# Patient Record
Sex: MALE | Race: WHITE | NOT HISPANIC OR LATINO | URBAN - METROPOLITAN AREA
[De-identification: names, ages, dates, MRNs, and addresses within clinical notes are randomized per-mention and may not be internally consistent; named-entity substitution may affect disease eponyms.]

---

## 2017-01-17 PROBLEM — G47.30 SLEEP APNEA, UNSPECIFIED TYPE: Status: ACTIVE | Noted: 2017-01-17

## 2017-10-09 PROBLEM — E55.9 VITAMIN D DEFICIENCY: Status: ACTIVE | Noted: 2017-10-09

## 2018-10-16 PROBLEM — G45.9 TIA (TRANSIENT ISCHEMIC ATTACK): Status: ACTIVE | Noted: 2018-10-16

## 2021-07-29 PROBLEM — D56.3 THALASSEMIA MINOR: Status: ACTIVE | Noted: 2021-07-29

## 2023-11-03 DIAGNOSIS — E78.2 MIXED HYPERLIPIDEMIA: Primary | ICD-10-CM

## 2023-11-10 ENCOUNTER — APPOINTMENT (OUTPATIENT)
Dept: URGENT CARE | Age: 68
End: 2023-11-10

## 2023-12-14 ENCOUNTER — APPOINTMENT (OUTPATIENT)
Dept: CT IMAGING | Age: 68
End: 2023-12-14
Attending: STUDENT IN AN ORGANIZED HEALTH CARE EDUCATION/TRAINING PROGRAM

## 2023-12-14 DIAGNOSIS — E78.2 MIXED HYPERLIPIDEMIA: ICD-10-CM

## 2023-12-20 ENCOUNTER — TELEPHONE (OUTPATIENT)
Dept: CARDIOLOGY | Age: 68
End: 2023-12-20

## 2024-05-13 ENCOUNTER — HOSPITAL ENCOUNTER (OUTPATIENT)
Age: 69
Discharge: HOME | End: 2024-05-13

## 2024-05-13 ENCOUNTER — APPOINTMENT (OUTPATIENT)
Dept: GENERAL RADIOLOGY | Age: 69
End: 2024-05-13
Attending: NURSE PRACTITIONER

## 2024-05-13 VITALS
RESPIRATION RATE: 20 BRPM | SYSTOLIC BLOOD PRESSURE: 150 MMHG | HEART RATE: 80 BPM | TEMPERATURE: 98 F | DIASTOLIC BLOOD PRESSURE: 70 MMHG | OXYGEN SATURATION: 99 %

## 2024-05-13 DIAGNOSIS — R06.02 SOB (SHORTNESS OF BREATH): ICD-10-CM

## 2024-05-13 DIAGNOSIS — J45.21 MILD INTERMITTENT ASTHMA WITH EXACERBATION (HCC): ICD-10-CM

## 2024-05-13 DIAGNOSIS — J20.8 ACUTE VIRAL BRONCHITIS: Primary | ICD-10-CM

## 2024-05-13 DIAGNOSIS — R05.1 ACUTE COUGH: ICD-10-CM

## 2024-05-13 LAB
#MXD IC: 0.5 X10ˆ3/UL (ref 0.1–1)
BUN BLD-MCNC: 14 MG/DL (ref 7–18)
CHLORIDE BLD-SCNC: 106 MMOL/L (ref 98–112)
CO2 BLD-SCNC: 22 MMOL/L (ref 21–32)
CREAT BLD-MCNC: 0.8 MG/DL
DDIMER WHOLE BLOOD: <200 NG/ML DDU (ref ?–400)
EGFRCR SERPLBLD CKD-EPI 2021: 96 ML/MIN/1.73M2 (ref 60–?)
GLUCOSE BLD-MCNC: 80 MG/DL (ref 70–99)
HCT VFR BLD AUTO: 31.7 %
HCT VFR BLD CALC: 31 %
HGB BLD-MCNC: 8.6 G/DL
ISTAT IONIZED CALCIUM FOR CHEM 8: 1.29 MMOL/L (ref 1.12–1.32)
LYMPHOCYTES # BLD AUTO: 1.7 X10ˆ3/UL (ref 1–4)
LYMPHOCYTES NFR BLD AUTO: 28.9 %
MCH RBC QN AUTO: <20 PG (ref 26–34)
MCHC RBC AUTO-ENTMCNC: 27.1 G/DL (ref 31–37)
MCV RBC AUTO: 65.8 FL (ref 80–100)
MIXED CELL %: 8.7 %
NEUTROPHILS # BLD AUTO: 3.6 X10ˆ3/UL (ref 1.5–7.7)
NEUTROPHILS NFR BLD AUTO: 62.4 %
PLATELET # BLD AUTO: 333 X10ˆ3/UL (ref 150–450)
POCT INFLUENZA A: NEGATIVE
POCT INFLUENZA B: NEGATIVE
POTASSIUM BLD-SCNC: 4 MMOL/L (ref 3.6–5.1)
RBC # BLD AUTO: 4.82 X10ˆ6/UL
S PYO AG THROAT QL: NEGATIVE
SARS-COV-2 RNA RESP QL NAA+PROBE: NOT DETECTED
SODIUM BLD-SCNC: 143 MMOL/L (ref 136–145)
TROPONIN I BLD-MCNC: <0.02 NG/ML
WBC # BLD AUTO: 5.8 X10ˆ3/UL (ref 4–11)

## 2024-05-13 PROCEDURE — 80047 BASIC METABLC PNL IONIZED CA: CPT | Performed by: NURSE PRACTITIONER

## 2024-05-13 PROCEDURE — 87502 INFLUENZA DNA AMP PROBE: CPT | Performed by: NURSE PRACTITIONER

## 2024-05-13 PROCEDURE — 87880 STREP A ASSAY W/OPTIC: CPT | Performed by: NURSE PRACTITIONER

## 2024-05-13 PROCEDURE — 71046 X-RAY EXAM CHEST 2 VIEWS: CPT | Performed by: NURSE PRACTITIONER

## 2024-05-13 PROCEDURE — 94640 AIRWAY INHALATION TREATMENT: CPT | Performed by: NURSE PRACTITIONER

## 2024-05-13 PROCEDURE — 84484 ASSAY OF TROPONIN QUANT: CPT | Performed by: NURSE PRACTITIONER

## 2024-05-13 PROCEDURE — 85025 COMPLETE CBC W/AUTO DIFF WBC: CPT | Performed by: NURSE PRACTITIONER

## 2024-05-13 PROCEDURE — 99204 OFFICE O/P NEW MOD 45 MIN: CPT | Performed by: NURSE PRACTITIONER

## 2024-05-13 PROCEDURE — U0002 COVID-19 LAB TEST NON-CDC: HCPCS | Performed by: NURSE PRACTITIONER

## 2024-05-13 PROCEDURE — 93000 ELECTROCARDIOGRAM COMPLETE: CPT | Performed by: NURSE PRACTITIONER

## 2024-05-13 RX ORDER — BENZONATATE 200 MG/1
200 CAPSULE ORAL 3 TIMES DAILY PRN
Qty: 20 CAPSULE | Refills: 0 | Status: SHIPPED | OUTPATIENT
Start: 2024-05-13

## 2024-05-13 RX ORDER — IPRATROPIUM BROMIDE AND ALBUTEROL SULFATE 2.5; .5 MG/3ML; MG/3ML
3 SOLUTION RESPIRATORY (INHALATION) ONCE
Status: COMPLETED | OUTPATIENT
Start: 2024-05-13 | End: 2024-05-13

## 2024-05-13 RX ORDER — PREDNISONE 20 MG/1
40 TABLET ORAL DAILY
Qty: 10 TABLET | Refills: 0 | Status: SHIPPED | OUTPATIENT
Start: 2024-05-13 | End: 2024-05-18

## 2024-05-13 RX ORDER — ALBUTEROL SULFATE 90 UG/1
2 AEROSOL, METERED RESPIRATORY (INHALATION) EVERY 4 HOURS PRN
Qty: 1 EACH | Refills: 0 | Status: SHIPPED | OUTPATIENT
Start: 2024-05-13 | End: 2024-06-12

## 2024-05-13 NOTE — ED PROVIDER NOTES
Chief Complaint   Patient presents with    Cough/URI       HPI:     Leodan Rodriguez is a 69 year old male who presents with a 3-day history of shortness of breath, dyspnea on exertion, headache, mild lightheadedness, and productive cough.  Patient states cough is productive of thick sputum and at times he is seeing a streak of blood in the sputum.  Patient states he returned from a trip to [place] 3 days ago and he has had the symptoms since being on the plane and returning home.  Patient does have a history of asthma and takes Breo daily however he states his albuterol has  and has not tried the albuterol to manage his current symptoms.  Patient denies any palpitations, he denies any specific chest pain.  He denies any near-syncope or syncopal episodes.  He has been managing his symptoms with Tylenol, Mucinex, and Zyrtec.  He states other members of his traveling group were ill last week with vomiting and diarrhea.  He denies any current nausea vomiting or diarrhea he denies any abdominal pain at this time.  Patient does have a significant history he has a known thalassemia anemia, diabetes, asthma, and had an acute myocardial infarction with stents in January of this year.  He is anticoagulated on both Xarelto and Plavix.      PFSH    PFSH asessment screens reviewed and agree.  Nurses notes reviewed I agree with documentation.    Family History   Problem Relation Age of Onset    Other (Other) Father         stroke at age 50    Heart Disorder Mother      Family history is reviewed and is as noted in HPI.  Social History     Socioeconomic History    Marital status:      Spouse name: Not on file    Number of children: Not on file    Years of education: Not on file    Highest education level: Not on file   Occupational History    Not on file   Tobacco Use    Smoking status: Never    Smokeless tobacco: Never   Vaping Use    Vaping status: Never Used   Substance and Sexual Activity    Alcohol use: Yes      Comment: occasional    Drug use: No    Sexual activity: Yes     Partners: Female   Other Topics Concern    Not on file   Social History Narrative    Not on file     Social Determinants of Health     Financial Resource Strain: Not on file   Food Insecurity: Not on file   Transportation Needs: Not on file   Physical Activity: Sufficiently Active (2/7/2024)    Received from [employer]    Physical Activity     On average, how many days per week do you engage in moderate to strenuous exercise (like a brisk walk)?: 7 days     On average, how many minutes do you engage in exercise at this level?: 30 min   Stress: Not on file   Social Connections: Not on file   Housing Stability: Not on file         ROS:   Positive for stated complaint: Shortness of breath  All other systems reviewed and negative except as noted above.  Constitutional and Vital Signs Reviewed.      Physical Exam:     Findings:    /70   Pulse 80   Temp 97.7 °F (36.5 °C) (Temporal)   Resp 20   SpO2 99%   GENERAL: well developed, well nourished, well hydrated, no distress  SKIN: good skin turgor, no obvious rashes  NECK: supple, no adenopathy  CARDIO: RRR without murmur, Cap refill brisk  EXTREMITIES: SPRING without difficulty, no weakness  HEAD: normocephalic, atraumatic, no facial asymmetry  EYES: bilateral sclera non icteric, conjunctiva mildly pale, no conjunctival injection or discharge, no periorbital swelling  EARS: Bilateral EACs clear, TMs without erythema or bulging, COL wnl,   NOSE: nasal mucosa pink, no discharge or bleeding  THROAT: airway patent, no intraoral lesions.  Has few red spots on uvula and soft palate.  No other erythema or tonsillar hypertrophy, no exudates, uvula midline,   LUNGS: Mildly diminished AE throughout chest, no rales or rhonchi, scattered wheezing throughout lung fields.  No signs of increased WOB, speaking in full sentences  NEURO: No observed focal deficits, speech clear  PSYCH: Alert and oriented x3.  Answering  questions appropriately.  Mood appropriate.    MDM/Assessment/Plan:   Orders for this encounter:    Orders Placed This Encounter    XR CHEST PA + LAT CHEST (CPT=71046)     Cough, Sob Pt developed a cough when flying back from [place] on Friday. States he is   coughing up green/yellow mucus that has streaks of blood. States the cough   is causing him to be short of breath. Pt with hx of asthma. Denies fevers.   Denies sick contacts.        Order Specific Question:   What is the Relevant Clinical Indication / Reason for Exam?     Answer:   Cough, Sob     Order Specific Question:   Release to patient     Answer:   Immediate    D-Dimer,Triage     Order Specific Question:   Release to patient     Answer:   Immediate    POCT CBC     Order Specific Question:   Release to patient     Answer:   Immediate    EKG 12 Lead     Order Specific Question:   Release to patient     Answer:   Immediate    POCT Rapid Strep    POCT ISTAT chem8 cartridge    ISTAT Troponin    POCT Flu Test     Order Specific Question:   Release to patient     Answer:   Immediate    Rapid SARS-CoV-2 by PCR     Order Specific Question:   Release to patient     Answer:   Immediate    iStat (Troponin)    iStat (Chem 8)    POCT Rapid Strep    iStat (Troponin)    ipratropium-albuterol (Duoneb) 0.5-2.5 (3) MG/3ML inhalation solution 3 mL     Order Specific Question:   Which area/hospital     Answer:   Intermittent nebulizer    albuterol 108 (90 Base) MCG/ACT Inhalation Aero Soln     Sig: Inhale 2 puffs into the lungs every 4 (four) hours as needed for Wheezing.     Dispense:  1 each     Refill:  0    predniSONE 20 MG Oral Tab     Sig: Take 2 tablets (40 mg total) by mouth daily for 5 days.     Dispense:  10 tablet     Refill:  0    benzonatate 200 MG Oral Cap     Sig: Take 1 capsule (200 mg total) by mouth 3 (three) times daily as needed for cough.     Dispense:  20 capsule     Refill:  0       Labs performed this visit:  Recent Results (from the past 10 hour(s))    POCT Flu Test    Collection Time: 05/13/24  5:43 PM    Specimen: Nares; Other   Result Value Ref Range    POCT INFLUENZA A Negative Negative    POCT INFLUENZA B Negative Negative   Rapid SARS-CoV-2 by PCR    Collection Time: 05/13/24  5:43 PM    Specimen: Nares; Other   Result Value Ref Range    Rapid SARS-CoV-2 by PCR Not Detected Not Detected   D-Dimer,Triage    Collection Time: 05/13/24  5:43 PM   Result Value Ref Range    D-Dimer DDU <200 <400 ng/mL DDU   POCT CBC    Collection Time: 05/13/24  5:43 PM   Result Value Ref Range    WBC IC 5.8 4.0 - 11.0 x10ˆ3/uL    RBC IC 4.82 3.80 - 5.80 X10ˆ6/uL    HGB IC 8.6 (L) 13.0 - 17.5 g/dL    HCT IC 31.7 (L) 39.0 - 53.0 %    MCV IC 65.8 (L) 80.0 - 100.0 fL    MCH IC <20.0 (L) 26.0 - 34.0 pg    MCHC IC 27.1 (L) 31.0 - 37.0 g/dL    PLT .0 150.0 - 450.0 X10ˆ3/uL    # Neutrophil 3.6 1.5 - 7.7 X10ˆ3/uL    # Lymphocyte 1.7 1.0 - 4.0 X10ˆ3/uL    # Mixed Cells 0.5 0.1 - 1.0 X10ˆ3/uL    Neutrophil % 62.4 %    Lymphocyte % 28.9 %    Mixed Cell % 8.7 %   POCT Rapid Strep    Collection Time: 05/13/24  5:52 PM   Result Value Ref Range    POCT Rapid Strep Negative Negative   EKG 12 Lead    Collection Time: 05/13/24  5:59 PM   Result Value Ref Range    Ventricular rate 76 BPM    Atrial rate 76 BPM    P-R Interval 136 ms    QRS Duration 94 ms    Q-T Interval 370 ms    QTC Calculation (Bezet) 416 ms    P Axis 53 degrees    R Axis -26 degrees    T Axis 32 degrees   POCT ISTAT chem8 cartridge    Collection Time: 05/13/24  6:09 PM   Result Value Ref Range    ISTAT Sodium 143 136 - 145 mmol/L    ISTAT BUN 14 7 - 18 mg/dL    ISTAT Potassium 4.0 3.6 - 5.1 mmol/L    ISTAT Chloride 106 98 - 112 mmol/L    ISTAT Ionized Calcium 1.29 1.12 - 1.32 mmol/L    ISTAT Hematocrit 31 (L) 37 - 53 %    ISTAT Glucose 80 70 - 99 mg/dL    ISTAT TCO2 22 21 - 32 mmol/L    ISTAT Creatinine 0.80 0.70 - 1.30 mg/dL    eGFR-Cr 96 >=60 mL/min/1.73m2   ISTAT Troponin    Collection Time: 05/13/24  6:11 PM   Result  Value Ref Range    ISTAT Troponin <0.02 <0.045 ng/mL ng/mL     EKG    Rate, intervals and axes as noted on EKG Report.  Rate: 76  Rhythm: Sinus Rhythm  Reading: Sinus rhythm no acute changes noted.  No prior EKG for comparison.               MDM:  Differential diagnosis includes acute bronchitis, asthma exacerbation, COVID, influenza, pulmonary embolus, pneumonia, congestive heart failure, acute coronary syndrome.  Rapid COVID and influenza and strep tests are all negative.  The chest x-ray does not show pneumonia.  The D-dimer and troponin were negative.  The patient does have anemia however he states this is due to his thalassemia and a hemoglobin of 8 is about his usual.  The EKG does not show any acute changes.  He states he is much improved after the DuoNeb, reassessment of his breath sounds show greater AE, and he is no longer wheezing.  Patient was discussed with Dr. Carrillo who agrees with the plan to discharge him with a diagnosis of acute viral bronchitis and asthma exacerbation.  He will be prescribed a refill on his albuterol inhaler and prednisone.  He was also advised to follow-up with his primary care provider and his pulmonologist.  He was [facility] on [facility] and he was given a disc of his chest x-ray.  Patient verbalizes understanding.   Counseled: Patient, regarding diagnosis, regarding treatment plan, regarding diagnostic results, regarding prescription, I have discussed with the patient the results of tests, differential diagnosis, and warning signs and symptoms that should prompt immediate return. The patient understands these instructions and agrees to the follow-up plan provided. There is no barriers to learning. Appropriate f/u given. Emergency precautions given. Patient agrees to return for any concerns/ problems/complications.      Diagnosis:    ICD-10-CM    1. Acute viral bronchitis  J20.8       2. SOB (shortness of breath)  R06.02 XR CHEST PA + LAT CHEST (CPT=71046)      Rapid SARS-CoV-2 by PCR     XR CHEST PA + LAT CHEST (CPT=71046)     Rapid SARS-CoV-2 by PCR      3. Acute cough  R05.1 POCT Flu Test     POCT Flu Test      4. Mild intermittent asthma with exacerbation (HCC)  J45.21           All results reviewed and discussed with patient.  See AVS for detailed discharge instructions for your condition today.    Follow Up with:  [facility]  [address]  [place] [facility] [id number] [phone]    In 2 days

## 2024-05-13 NOTE — ED INITIAL ASSESSMENT (HPI)
Pt developed a cough when flying back from [place] on Friday. States he is coughing up green/yellow mucus that has streaks of blood. States the cough is causing him to be short of breath. Pt with hx of asthma. Denies fevers. Denies sick contacts.

## 2024-05-13 NOTE — DISCHARGE INSTRUCTIONS
The tests were negative as we discussed.  The [facility] are readily [facility].  Your primary care provider and or pulmonologist can also request those records when you are they are.  This is an acute viral bronchitis which is triggered your asthma and wheezing.  Continue the Breo daily, use the albuterol inhaler 2 puffs every 4 hours as needed for wheezing or tight cough.  Drink lots of fluids, and take the steroid as prescribed daily with food.  May also take the benzonatate or Mucinex DM for the cough as needed.  Call your primary care provider or your pulmonologist and schedule a follow-up appointment in the next 3 to 5 days.  If any increasing shortness of breath, chest pain, palpitations, or other concerns return here or go directly to the ER.

## 2024-05-14 LAB
ATRIAL RATE: 76 BPM
P AXIS: 53 DEGREES
P-R INTERVAL: 136 MS
Q-T INTERVAL: 370 MS
QRS DURATION: 94 MS
QTC CALCULATION (BEZET): 416 MS
R AXIS: -26 DEGREES
T AXIS: 32 DEGREES
VENTRICULAR RATE: 76 BPM